# Patient Record
Sex: MALE | Race: WHITE
[De-identification: names, ages, dates, MRNs, and addresses within clinical notes are randomized per-mention and may not be internally consistent; named-entity substitution may affect disease eponyms.]

---

## 2020-10-29 ENCOUNTER — HOSPITAL ENCOUNTER (OUTPATIENT)
Dept: HOSPITAL 46 - OPS | Age: 45
Discharge: HOME | End: 2020-10-29
Attending: SURGERY
Payer: COMMERCIAL

## 2020-10-29 VITALS — HEIGHT: 68 IN | WEIGHT: 207.23 LBS | BODY MASS INDEX: 31.41 KG/M2

## 2020-10-29 DIAGNOSIS — Z79.899: ICD-10-CM

## 2020-10-29 DIAGNOSIS — K21.9: ICD-10-CM

## 2020-10-29 DIAGNOSIS — K44.9: ICD-10-CM

## 2020-10-29 DIAGNOSIS — K31.9: Primary | ICD-10-CM

## 2020-10-29 DIAGNOSIS — F17.220: ICD-10-CM

## 2020-10-29 PROCEDURE — 0DB78ZX EXCISION OF STOMACH, PYLORUS, VIA NATURAL OR ARTIFICIAL OPENING ENDOSCOPIC, DIAGNOSTIC: ICD-10-PCS | Performed by: SURGERY

## 2020-10-29 PROCEDURE — G0500 MOD SEDAT ENDO SERVICE >5YRS: HCPCS

## 2020-10-29 NOTE — NUR
10/29/20 0911 Ximena Mcgregor
0908- PT TO PACU IN SUPINE POSITION. EYES OPEN. PT A&O X 3. DENIES
PAIN NAUSEA OR DIZZINESS. SPO2 >95% ON 2 L O2 VIA NC. BREATHING EASY
AND UNLABORED. VSS

## 2020-10-30 NOTE — OR
[facility]
                                    [address], [place]  [address]
_________________________________________________________________________________________
                                                                 Signed   
 
 
DATE OF OPERATION:
10/29/2020
 
SURGEON:
Dang Urbina MD
 
PREOPERATIVE DIAGNOSES:
1. Heartburn.
2. Gastroesophageal reflux disease.
 
POSTOPERATIVE DIAGNOSES:
1. Small hiatal hernia.
2. Minimal distal gastritis.
 
PROCEDURES:
EGD with CLOtest and biopsies of the antrum.
 
ESTIMATED BLOOD LOSS:
None.
 
INDICATIONS:
Ramsey is a 45-year-old gentleman, who happens to work as a .  The
last 3 or 4 years he has been having trouble with heartburn and acid reflux.  He said
Tums can usually take care of it.  However, he had an episode that went on for almost 14
hours, he had gone to the emergency room __________ lidocaine really helped.  He has
been on omeprazole and sucralfate and he said that really made a difference.
Consequently, he was asked to see me for upper endoscopy.  In the office, I gave him a
pamphlet on upper endoscopy and we looked at that together in detail.  He understands
the nature of the test along with the risks including, but not limited to gas bloating,
crampy abdominal pain, bleeding, perforation requiring surgery, and missed diagnosis.
He also understands the need for IV conscious sedation.  He had expressed understanding
and wished to proceed. 
 
DESCRIPTION OF PROCEDURE:
Ramsey was taken into our endoscopy suite and placed in the supine semi-recumbent
position.  The posterior oropharynx was anesthetized with lidocaine spray.  A bite block
was utilized for the case.  He was given a total of 5 mg of Versed and 100 mcg of
fentanyl to cover the case.  The adult gastroscope was introduced and advanced out in
the third portion of the duodenum under direct visualization of camera without
difficulty.  The duodenum and pyloric channel were unremarkable.  Back in the stomach,
he had very mild patchy erythematous changes with what appears to be healing and
resolving gastritis.  We took a biopsy of the antrum for CLOtest as well as pathologic
 
    Electronically Signed By: DANG URBINA MD  10/30/20 0629
_________________________________________________________________________________________
PATIENT NAME:     RAMSEY ESTRADA                   
MEDICAL RECORD #: [id number]            OPERATIVE REPORT              
          ACCT #: [id number]  
DATE OF BIRTH:   [birth year]            REPORT #: [id number]      
PHYSICIAN:        DANG URBINA MD             
PCP:              VENKATA MCNEAL MD      
REPORT IS CONFIDENTIAL AND NOT TO BE RELEASED WITHOUT AUTHORIZATION
 
 
                                  [facility]
                                    [address], [place]  [address]
_________________________________________________________________________________________
                                                                 Signed   
 
 
review.  The rest of the stomach was unremarkable.  Upon retroflexion of scope, he does
have a small hiatal hernia.  The scope was withdrawn up through the area of the GE
junction, which was compliant without stricture.  Very minimal disruption at all to the
Z-line.  No Alonso's mucosa.  No distal esophagitis.  The middle and upper esophagus
were unremarkable.  After this, the gas was suctioned out and the gastroscope removed.
Ramsey tolerated the procedure quite well. 
 
RECOMMENDATIONS:
I will see Khadar back in my office in 7 to 14 days to review his results.
 
 
 
            ________________________________________
            Dang Urbina MD 
 
 
ALB/MODL
Job #:  [id number]
DD:  10/29/2020 09:11:53
DT:  10/29/2020 09:53:28
 
cc:            MD Venkata Sommer MD
 
 
Copies:  DANG URBINA MD, RUSSELL BARR MD
~
 
 
 
 
 
 
 
 
 
 
 
 
 
 
    Electronically Signed By: DANG URBINA MD  10/30/20 0629
_________________________________________________________________________________________
PATIENT NAME:     RAMSEY ESTRADA                   
MEDICAL RECORD #: [id number]            OPERATIVE REPORT              
          ACCT #: [id number]  
DATE OF BIRTH:   [birth year]            REPORT #: [id number]      
PHYSICIAN:        DANG URBINA MD             
PCP:              VENKATA MCNEAL MD      
REPORT IS CONFIDENTIAL AND NOT TO BE RELEASED WITHOUT AUTHORIZATION

## 2020-10-30 NOTE — PATH
[facility] [place] [facility]
                                    [address], [place]  [address]
_________________________________________________________________________________________
                                                                 Signed   
 
 
 
SPECIMEN(S): A ANTRUM/PYLORUS
 
SPECIMEN SOURCE:
A. ANTRUM/PYLORUS
 
CLINICAL HISTORY:
GERD.  Gastritis, small hiatal hernia.
MICROSCOPIC DESCRIPTION:
Histologic sections of all submitted blocks are examined by light microscopy. 
These findings, together with the gross examination, support the pathologic 
diagnosis. 
 
FINAL PATHOLOGIC DIAGNOSIS:
Stomach, antrum/pylorus, biopsy:
-  Antral mucosa with mild reactive gastropathy.
-  Negative for Helicobacter organisms on HE stain.
-  Negative for dysplasia or malignancy.
NAL:cml:C2NR
 
GROSS DESCRIPTION:
The specimen, labeled "FW, antrum biopsy," is received in formalin and consists 
of one tan soft tissue fragment that measures 0.2 cm in greatest dimension. The 
specimen is entirely submitted in 
cassette (A1).
JS (under the direct supervision of a pathologist)
The Gross Description was prepared using a voice recognition system.  The 
report was reviewed for accuracy; however, sound-alike word errors, addition 
and/or deletions may occur.  If there is any 
question about this report, please contact Client Services.
 
PERFORMING LABORATORY:
The technical component was performed by [facility], [address] [place] [id number] (Medical Director: Cyndy Tompkins MD; CLIA# [id number]). 
[facility], [address] [address] (CLIA# [id number]). 
 
Diagnostician:  Kristin Amador MD
Pathologist
Electronically Signed 10/30/2020
 
 
                                                                                    
_________________________________________________________________________________________
PATIENT NAME:     REHAN ESTRADA                   
MEDICAL RECORD #: [id number]            PATHOLOGY                     
          ACCT #: [id number]       ACCESSION #: [id number]     
DATE OF BIRTH:   [birth year]            REPORT #: [id number]       
PHYSICIAN:        [facility]              
PCP:              VENKATA MCNEAL MD      
REPORT IS CONFIDENTIAL AND NOT TO BE RELEASED WITHOUT AUTHORIZATION
 
 
                                  [address] [place], [place]  [address]
_________________________________________________________________________________________
                                                                 Signed   
 
 
Copies:                                
~
 
 
 
 
 
 
 
 
 
 
 
 
 
 
 
 
 
 
 
 
 
 
 
 
 
 
 
 
 
 
 
 
 
 
 
 
 
 
 
 
 
                                                                                    
_________________________________________________________________________________________
PATIENT NAME:     REHAN ESTRADA                   
MEDICAL RECORD #: [id number]            PATHOLOGY                     
          ACCT #: [id number]       ACCESSION #: [id number]     
DATE OF BIRTH:   [birth year]            REPORT #: [id number]       
PHYSICIAN:        [facility]              
PCP:              VENKATA MCNEAL MD      
REPORT IS CONFIDENTIAL AND NOT TO BE RELEASED WITHOUT AUTHORIZATION